# Patient Record
Sex: MALE | Race: WHITE | NOT HISPANIC OR LATINO | Employment: FULL TIME | URBAN - METROPOLITAN AREA
[De-identification: names, ages, dates, MRNs, and addresses within clinical notes are randomized per-mention and may not be internally consistent; named-entity substitution may affect disease eponyms.]

---

## 2023-10-10 ENCOUNTER — TELEPHONE (OUTPATIENT)
Dept: PRIMARY CARE | Facility: CLINIC | Age: 23
End: 2023-10-10
Payer: COMMERCIAL

## 2023-10-10 NOTE — LETTER
October 10, 2023     Kevin Baumann  [address] [facility] [address]    Patient: Kevin Baumann   YOB: 2000   Date of Visit: 10/10/2023     To Whom This May Concern:    Kevin Baumann cannot get the flu vaccine due to having an allergic reaction to it in the past.    Please call if you have any questions or concerns.      Sincerely,        Macy Gomez, APRN-CNP

## 2023-10-26 ENCOUNTER — TELEPHONE (OUTPATIENT)
Dept: PRIMARY CARE | Facility: CLINIC | Age: 23
End: 2023-10-26
Payer: COMMERCIAL

## 2023-10-26 NOTE — TELEPHONE ENCOUNTER
Got in 2019, arm swelled up, was red and sore to the touch. Patient informed you were not in til Monday and is ok with that

## 2023-10-26 NOTE — TELEPHONE ENCOUNTER
Patient came in for an appointment to get a letter for a flu shot exemption and the people want more details as to why he cannot get the shot.  They want to know more than just an allergic reaction.  They want more details. Was it red, puffy etc.

## 2023-10-26 NOTE — LETTER
October 29, 2023       Patient: Kevin Baumann   YOB: 2000   Date of Visit: 10/26/2023     To Whom This May Concern:    Kevin Baumann cannot get the flu vaccine due to having an allergic reaction to it in the past, which includes arm swelling, redness and pain to palpation.    Please call if you have any questions or concerns.      Sincerely,        Macy Gomez, APRN-CNP

## 2023-10-26 NOTE — TELEPHONE ENCOUNTER
Please find out what allergic reaction/symptoms he had to the flu shot  Let him know I am not in the office until Monday.

## 2023-10-29 NOTE — TELEPHONE ENCOUNTER
Letter is done  Pls put up front for  (it is on your desk)    Pls call pt to let him know that it is up front for

## 2023-11-15 ENCOUNTER — APPOINTMENT (OUTPATIENT)
Dept: PRIMARY CARE | Facility: CLINIC | Age: 23
End: 2023-11-15
Payer: COMMERCIAL

## 2023-11-29 ENCOUNTER — OFFICE VISIT (OUTPATIENT)
Dept: PRIMARY CARE | Facility: CLINIC | Age: 23
End: 2023-11-29
Payer: COMMERCIAL

## 2023-11-29 VITALS
HEIGHT: 72 IN | SYSTOLIC BLOOD PRESSURE: 131 MMHG | WEIGHT: 152.2 LBS | TEMPERATURE: 97.3 F | BODY MASS INDEX: 20.62 KG/M2 | DIASTOLIC BLOOD PRESSURE: 80 MMHG | HEART RATE: 77 BPM | OXYGEN SATURATION: 97 %

## 2023-11-29 DIAGNOSIS — Z00.00 ROUTINE GENERAL MEDICAL EXAMINATION AT A HEALTH CARE FACILITY: Primary | ICD-10-CM

## 2023-11-29 PROBLEM — E55.9 VITAMIN D DEFICIENCY: Status: ACTIVE | Noted: 2023-11-29

## 2023-11-29 PROBLEM — I34.1 MVP (MITRAL VALVE PROLAPSE): Status: ACTIVE | Noted: 2023-11-29

## 2023-11-29 PROBLEM — I49.9 ARRHYTHMIA: Status: ACTIVE | Noted: 2023-11-29

## 2023-11-29 PROBLEM — R19.8 ASYMMETRY OF ABDOMINAL WALL: Status: ACTIVE | Noted: 2023-11-29

## 2023-11-29 PROCEDURE — 1036F TOBACCO NON-USER: CPT | Performed by: NURSE PRACTITIONER

## 2023-11-29 PROCEDURE — 99395 PREV VISIT EST AGE 18-39: CPT | Performed by: NURSE PRACTITIONER

## 2023-11-29 PROCEDURE — 3008F BODY MASS INDEX DOCD: CPT | Performed by: NURSE PRACTITIONER

## 2023-11-29 RX ORDER — ASCORBIC ACID 500 MG
500 TABLET ORAL DAILY
COMMUNITY

## 2023-11-29 RX ORDER — CHOLECALCIFEROL (VITAMIN D3) 50 MCG
50 TABLET ORAL 2 TIMES DAILY
COMMUNITY

## 2023-11-29 ASSESSMENT — ENCOUNTER SYMPTOMS
BLOOD IN STOOL: 0
FREQUENCY: 0
ARTHRALGIAS: 0
POLYDIPSIA: 0
DYSPHORIC MOOD: 0
PALPITATIONS: 0
DYSURIA: 0
VOMITING: 0
COUGH: 0
EYE REDNESS: 0
SHORTNESS OF BREATH: 0
ABDOMINAL PAIN: 0
NAUSEA: 0
EYE DISCHARGE: 0
HEADACHES: 0
DIARRHEA: 0
CONSTIPATION: 0
DIZZINESS: 0
NERVOUS/ANXIOUS: 0
FATIGUE: 0
BRUISES/BLEEDS EASILY: 0
POLYPHAGIA: 0
CHILLS: 0
ADENOPATHY: 0
RHINORRHEA: 0
MYALGIAS: 0
FEVER: 0
SORE THROAT: 0
HEMATURIA: 0
WHEEZING: 0

## 2023-11-29 ASSESSMENT — PATIENT HEALTH QUESTIONNAIRE - PHQ9
SUM OF ALL RESPONSES TO PHQ9 QUESTIONS 1 AND 2: 0
2. FEELING DOWN, DEPRESSED OR HOPELESS: NOT AT ALL
1. LITTLE INTEREST OR PLEASURE IN DOING THINGS: NOT AT ALL

## 2023-11-29 NOTE — PROGRESS NOTES
"Subjective   Patient ID: Kevin Baumann is a 23 y.o. male who presents for Annual Exam.      Is pt fasting? no  Does pt see any providers other than me: no    Does pt want flu shot? Already got  Is pt still taking vit d otc 1 bid? yes  Any other questions or concerns that pt wants to discuss today?  no        No care team member to display    HPI  Last labs-8/2022 vit d nl; 2021 cmp nl, cbc nl, tsh nl, lipid nl  Due for labs- all    No results found for: \"CHOL\"  No results found for: \"TRIG\"  No results found for: \"HDL\"  No results found for: \"LDL\"  No results found for: \"TSH\"  No results found for: \"A1C\"  No components found for: \"POCA1C\"  No results found for: \"ALBUR\"  No components found for: \"POCALBUR\"      Other concerns:none    bps at home- none    ER/urgicare visits in the last year- none  Hospitalizations in the last year- none    FH colon ca-mat uncle  FH prostate ca-    Exercise- gym  Started a new job  Diet-3-4 meals a day   There is no height or weight on file to calculate BMI.      last dental appt- early 2023    BMs-regular  Sleep-able to fall asleep and stay asleep; no snoring or apnea  no cp, swelling, sob, abd pain, n/v/d/c, blood in stool or black stools  STI testing including hiv (age 15-65) and hep c screening (18-79)-declines        Immunization History   Administered Date(s) Administered    HPV 9-valent vaccine (GARDASIL 9) 09/05/2018, 11/07/2018, 08/26/2021    Tdap vaccine, age 7 year and older (BOOSTRIX) 08/23/2021           fractures in lifetime-none      FH heart attack, heart surgery-mat gf  FH stroke-none    The ASCVD Risk score (Magdiel PERSAUD, et al., 2019) failed to calculate for the following reasons:    The 2019 ASCVD risk score is only valid for ages 40 to 79  Coronary Artery Calcium score:  This test is recommended for men 45 or older and women 55 or older without a history of heart disease and have 1 risk factor (high LDL cholesterol, low HDL cholesterol, high blood pressure, smoker " (current or past), type 2 diabetes, IBD, lupus, RA, ankylosing spondylitis, psoriasis or family history of  heart disease <55yrs in dad, brother or child or <65yrs in mom, sister, or child.)       Review of Systems   Constitutional:  Negative for chills, fatigue and fever.   HENT:  Negative for congestion, ear pain, postnasal drip, rhinorrhea and sore throat.    Eyes:  Negative for discharge, redness and visual disturbance.   Respiratory:  Negative for cough, shortness of breath and wheezing.    Cardiovascular:  Negative for chest pain, palpitations and leg swelling.   Gastrointestinal:  Negative for abdominal pain, blood in stool, constipation, diarrhea, nausea and vomiting.   Endocrine: Negative for cold intolerance, polydipsia, polyphagia and polyuria.   Genitourinary:  Negative for dysuria, frequency, genital sores, hematuria, penile pain, testicular pain and urgency.   Musculoskeletal:  Negative for arthralgias and myalgias.   Skin:  Negative for rash.   Neurological:  Negative for dizziness, syncope and headaches.   Hematological:  Negative for adenopathy. Does not bruise/bleed easily.   Psychiatric/Behavioral:  Negative for dysphoric mood. The patient is not nervous/anxious.        Objective   There were no vitals taken for this visit.   BP Readings from Last 3 Encounters:   05/31/22 106/52   08/23/21 124/70     Wt Readings from Last 3 Encounters:   05/31/22 65.1 kg (143 lb 9.6 oz)   08/23/21 65.3 kg (144 lb)           Physical Exam  Vitals reviewed.   Constitutional:       General: He is not in acute distress.     Appearance: Normal appearance. He is not ill-appearing.   HENT:      Head: Normocephalic.      Right Ear: Tympanic membrane, ear canal and external ear normal.      Left Ear: Tympanic membrane, ear canal and external ear normal.      Nose: Nose normal.      Mouth/Throat:      Mouth: Mucous membranes are moist.      Pharynx: Oropharynx is clear. No oropharyngeal exudate or posterior oropharyngeal  erythema.   Eyes:      Extraocular Movements: Extraocular movements intact.      Conjunctiva/sclera: Conjunctivae normal.      Pupils: Pupils are equal, round, and reactive to light.   Cardiovascular:      Rate and Rhythm: Normal rate and regular rhythm.      Heart sounds: Normal heart sounds. No murmur heard.  Pulmonary:      Effort: Pulmonary effort is normal. No respiratory distress.      Breath sounds: Normal breath sounds. No wheezing, rhonchi or rales.   Abdominal:      General: Bowel sounds are normal. There is no distension.      Palpations: Abdomen is soft. There is no mass.      Tenderness: There is no abdominal tenderness.   Musculoskeletal:         General: No swelling or tenderness. Normal range of motion.      Cervical back: Normal range of motion and neck supple. No tenderness.      Right lower leg: No edema.      Left lower leg: No edema.   Lymphadenopathy:      Cervical: No cervical adenopathy.   Skin:     General: Skin is warm.      Findings: No rash.   Neurological:      General: No focal deficit present.      Mental Status: He is alert and oriented to person, place, and time.      Cranial Nerves: No cranial nerve deficit.   Psychiatric:         Mood and Affect: Mood normal.         Behavior: Behavior normal.         Assessment/Plan   Diagnoses and all orders for this visit:  Routine general medical examination at a health care facility  -     Comprehensive Metabolic Panel; Future  -     CBC and Auto Differential; Future  -     Lipid Panel; Future  -     TSH with reflex to Free T4 if abnormal; Future  BMI 20.0-20.9, adult  Other orders  -     Follow Up In Primary Care - Health Maintenance; Future